# Patient Record
Sex: MALE | Race: WHITE | NOT HISPANIC OR LATINO | Employment: FULL TIME | URBAN - NONMETROPOLITAN AREA
[De-identification: names, ages, dates, MRNs, and addresses within clinical notes are randomized per-mention and may not be internally consistent; named-entity substitution may affect disease eponyms.]

---

## 2021-02-05 ENCOUNTER — OFFICE VISIT (OUTPATIENT)
Dept: ORTHOPEDIC SURGERY | Facility: CLINIC | Age: 51
End: 2021-02-05

## 2021-02-05 ENCOUNTER — HOSPITAL ENCOUNTER (OUTPATIENT)
Dept: GENERAL RADIOLOGY | Facility: HOSPITAL | Age: 51
Discharge: HOME | End: 2021-02-05
Admitting: FAMILY MEDICINE

## 2021-02-05 VITALS
HEIGHT: 68 IN | WEIGHT: 167 LBS | HEART RATE: 60 BPM | SYSTOLIC BLOOD PRESSURE: 140 MMHG | TEMPERATURE: 97.8 F | BODY MASS INDEX: 25.31 KG/M2 | DIASTOLIC BLOOD PRESSURE: 82 MMHG

## 2021-02-05 DIAGNOSIS — M25.512 CHRONIC LEFT SHOULDER PAIN: Primary | ICD-10-CM

## 2021-02-05 DIAGNOSIS — M25.512 CHRONIC LEFT SHOULDER PAIN: ICD-10-CM

## 2021-02-05 DIAGNOSIS — G89.29 CHRONIC LEFT SHOULDER PAIN: ICD-10-CM

## 2021-02-05 DIAGNOSIS — G89.29 CHRONIC LEFT SHOULDER PAIN: Primary | ICD-10-CM

## 2021-02-05 DIAGNOSIS — M75.42 IMPINGEMENT SYNDROME OF LEFT SHOULDER: ICD-10-CM

## 2021-02-05 DIAGNOSIS — M25.612 SHOULDER STIFFNESS, LEFT: ICD-10-CM

## 2021-02-05 DIAGNOSIS — M75.52 BURSITIS OF LEFT SHOULDER: ICD-10-CM

## 2021-02-05 PROCEDURE — 20610 DRAIN/INJ JOINT/BURSA W/O US: CPT | Performed by: FAMILY MEDICINE

## 2021-02-05 PROCEDURE — 73030 X-RAY EXAM OF SHOULDER: CPT | Performed by: RADIOLOGY

## 2021-02-05 PROCEDURE — 99204 OFFICE O/P NEW MOD 45 MIN: CPT | Performed by: FAMILY MEDICINE

## 2021-02-05 PROCEDURE — 73030 X-RAY EXAM OF SHOULDER: CPT

## 2021-02-05 RX ORDER — NAPROXEN 250 MG/1
250 TABLET ORAL 2 TIMES DAILY PRN
COMMUNITY

## 2021-02-05 RX ORDER — IBUPROFEN 800 MG/1
800 TABLET ORAL 3 TIMES DAILY
Qty: 90 TABLET | Refills: 1 | Status: SHIPPED | OUTPATIENT
Start: 2021-02-05

## 2021-02-05 RX ORDER — LIDOCAINE HYDROCHLORIDE 10 MG/ML
8 INJECTION, SOLUTION EPIDURAL; INFILTRATION; INTRACAUDAL; PERINEURAL
Status: COMPLETED | OUTPATIENT
Start: 2021-02-05 | End: 2021-02-05

## 2021-02-05 RX ORDER — TRIAMCINOLONE ACETONIDE 40 MG/ML
40 INJECTION, SUSPENSION INTRA-ARTICULAR; INTRAMUSCULAR
Status: COMPLETED | OUTPATIENT
Start: 2021-02-05 | End: 2021-02-05

## 2021-02-05 RX ORDER — IBUPROFEN 200 MG
200 TABLET ORAL EVERY 6 HOURS PRN
COMMUNITY
End: 2021-02-05

## 2021-02-05 RX ADMIN — LIDOCAINE HYDROCHLORIDE 8 ML: 10 INJECTION, SOLUTION EPIDURAL; INFILTRATION; INTRACAUDAL; PERINEURAL at 11:29

## 2021-02-05 RX ADMIN — TRIAMCINOLONE ACETONIDE 40 MG: 40 INJECTION, SUSPENSION INTRA-ARTICULAR; INTRAMUSCULAR at 11:29

## 2021-02-05 NOTE — PROGRESS NOTES
New Patient Visit      Patient: Ladarius Butt  YOB: 1970  Date of Encounter: 02/05/2021  PCP: Kreis, Samuel Duane, MD  Referring Provider: Self Referring     Subjective   Ladarius Butt is a 50 y.o. male who presents to the office today for evaluation of Shoulder Pain      Chief Complaint:   Chief Complaint   Patient presents with   • Shoulder Pain       HPI:   HPI  New patient presents complaining of ongoing left shoulder pain for about a year. Pain bothers him worse when he has working with his arm above midline. Gets pain on the lateral shoulder sometimes severe. Says this started when he was doing CrossFit with his son and they did a workout with a lot of Burpees. He noticed that he was sore after and it has continued In some capacity ever since. Pain never goes away completely and sometimes wakes him up at night. He is not having any pain. He has been taking ibuprofen and Aleve intermittently which are helpful.  Active Problem List:  There is no problem list on file for this patient.      Past Medical History:  History reviewed. No pertinent past medical history.    Past Surgical History:  History reviewed. No pertinent surgical history.    Family History:  No family history on file.    Social History:  Social History     Socioeconomic History   • Marital status:      Spouse name: Not on file   • Number of children: Not on file   • Years of education: Not on file   • Highest education level: Not on file   Tobacco Use   • Smoking status: Never Smoker   Substance and Sexual Activity   • Alcohol use: Defer   • Drug use: Defer   • Sexual activity: Defer       Medications:  Current Outpatient Medications   Medication Sig Dispense Refill   • ibuprofen (ADVIL,MOTRIN) 200 MG tablet Take 200 mg by mouth Every 6 (Six) Hours As Needed for Mild Pain .     • naproxen (NAPROSYN) 250 MG tablet Take 250 mg by mouth 2 (Two) Times a Day As Needed for Mild Pain .       No current facility-administered  "medications for this visit.        Allergies:  No Known Allergies    Review of Systems:   Review of Systems   Constitutional: Positive for activity change.   Respiratory: Negative.    Cardiovascular: Negative.    Musculoskeletal: Positive for arthralgias and myalgias. Negative for neck pain and neck stiffness.   Neurological: Negative for weakness and numbness.       Physical Exam:   Physical Exam  Vitals signs and nursing note reviewed.   Constitutional:       General: He is not in acute distress.     Appearance: Normal appearance.   Cardiovascular:      Rate and Rhythm: Normal rate.   Pulmonary:      Effort: Pulmonary effort is normal. No respiratory distress.      Breath sounds: Normal breath sounds.   Musculoskeletal:      Left shoulder: He exhibits decreased range of motion, tenderness and spasm. He exhibits no bony tenderness, normal pulse and normal strength.      Comments: Tender palpation over lateral shoulder/deltoid near the AC. Not tender anywhere else. Pain is exacerbated in this area on elevation, internal rotation, Neer's test AC shear and empty can.    Rotator cuff biceps triceps all intact. Mildly restricted range of motion in elevation and notably restricted in internal rotation with pain.    Exam of C-spine elbow and wrist WNL. Negative Spurling.   Neurological:      General: No focal deficit present.      Mental Status: He is alert and oriented to person, place, and time.      Sensory: No sensory deficit.      Motor: No weakness.      Deep Tendon Reflexes: Reflexes normal.       GENERAL: 50 y.o. male, alert and oriented X 3 in no acute distress.   Visit Vitals  /82   Pulse 60   Temp 97.8 °F (36.6 °C)   Ht 172.7 cm (68\")   Wt 75.8 kg (167 lb)   BMI 25.39 kg/m²       Radiology Results:    Xr Shoulder 2+ View Left    Result Date: 2/5/2021    No acute findings in the left shoulder.  This report was finalized on 2/5/2021 11:03 AM by Dr. Yohan Edwards MD.    Independently reviewed imaging and " agree with radiologist    Large Joint Arthrocentesis: L subacromial bursa  Date/Time: 2/5/2021 11:29 AM  Consent given by: patient  Site marked: site marked  Timeout: Immediately prior to procedure a time out was called to verify the correct patient, procedure, equipment, support staff and site/side marked as required   Supporting Documentation  Indications: pain   Procedure Details  Location: shoulder - L subacromial bursa  Needle size: 22 G  Approach: posterior  Medications administered: 40 mg triamcinolone acetonide 40 MG/ML; 8 mL lidocaine PF 1% 1 %  Patient tolerance: patient tolerated the procedure well with no immediate complications      Injection site on the posterior left shoulder was cleaned with alcohol and iodine.  Ethyl chloride was used for anesthesia.  Using sterile technique and a 22-gauge 1.5 inch needle, the patient was injected into the left subacromial space using posterior approach with a mixture of 40 mg triamcinolone and 9 cc 1% lidocaine without epinephrine.  Patient did note improved pain after there were no adverse effects.  Follow-up care and precautions were discussed.    Assessment & Plan:   Assessment/Plan   Diagnoses and all orders for this visit:    1. Chronic left shoulder pain (Primary)  -     XR shoulder 2+ vw left; Future    2. Bursitis of left shoulder    3. Impingement syndrome of left shoulder    4. Shoulder stiffness, left    Other orders  -     Large Joint Arthrocentesis        MEDICATION ISSUES:  Discussed medication options and treatment plan of prescribed medication as well as the risks, benefits, and side effects including potential falls, possible impaired driving and metabolic adversities among others. Patient is agreeable to call the office with any worsening of symptoms or onset of side effects. Patient is agreeable to call 911 or go to the nearest ER should he/she begin having SI/HI.     MEDS ORDERED DURING VISIT:  No orders of the defined types were placed in this  encounter.  Patient did well with injection.  Will call us in 1 week to let us know how he is responded, and will come in sooner if he has any adverse effects.  Discussed follow-up care precautions.  Patient was given handouts for shoulder exercises and will do them daily and follow-up in 1 month.  Sent in a prescription for 800 mg ibuprofen which patient will take consistently for 2 weeks and then as needed.         This document has been electronically signed by Nick Hoover DO   February 5, 2021 11:30 EST    Part of this note may be an electronic transcription/translation of spoken language to printed text using the Dragon Dictation System.

## 2021-03-18 ENCOUNTER — BULK ORDERING (OUTPATIENT)
Dept: CASE MANAGEMENT | Facility: OTHER | Age: 51
End: 2021-03-18

## 2021-03-18 DIAGNOSIS — Z23 IMMUNIZATION DUE: ICD-10-CM

## 2024-11-29 ENCOUNTER — APPOINTMENT (OUTPATIENT)
Dept: GENERAL RADIOLOGY | Facility: HOSPITAL | Age: 54
End: 2024-11-29
Payer: COMMERCIAL

## 2024-11-29 ENCOUNTER — HOSPITAL ENCOUNTER (EMERGENCY)
Facility: HOSPITAL | Age: 54
Discharge: HOME | End: 2024-11-29
Attending: EMERGENCY MEDICINE
Payer: COMMERCIAL

## 2024-11-29 VITALS
SYSTOLIC BLOOD PRESSURE: 116 MMHG | DIASTOLIC BLOOD PRESSURE: 76 MMHG | BODY MASS INDEX: 26.52 KG/M2 | RESPIRATION RATE: 18 BRPM | WEIGHT: 175 LBS | HEART RATE: 58 BPM | OXYGEN SATURATION: 96 % | TEMPERATURE: 97.5 F | HEIGHT: 68 IN

## 2024-11-29 DIAGNOSIS — T50.905A ADVERSE EFFECT OF DRUG, INITIAL ENCOUNTER: Primary | ICD-10-CM

## 2024-11-29 DIAGNOSIS — R00.1 BRADYCARDIA: ICD-10-CM

## 2024-11-29 DIAGNOSIS — I95.2 HYPOTENSION DUE TO DRUGS: ICD-10-CM

## 2024-11-29 LAB
ALBUMIN SERPL-MCNC: 3.7 G/DL (ref 3.5–5.2)
ALBUMIN/GLOB SERPL: 1.8 G/DL
ALP SERPL-CCNC: 60 U/L (ref 39–117)
ALT SERPL W P-5'-P-CCNC: <5 U/L (ref 1–41)
ANION GAP SERPL CALCULATED.3IONS-SCNC: 7 MMOL/L (ref 5–15)
ANISOCYTOSIS BLD QL: ABNORMAL
AST SERPL-CCNC: 14 U/L (ref 1–40)
BASOPHILS # BLD MANUAL: 0.05 10*3/MM3 (ref 0–0.2)
BASOPHILS NFR BLD MANUAL: 1 % (ref 0–1.5)
BILIRUB SERPL-MCNC: 0.8 MG/DL (ref 0–1.2)
BUN BLDA-MCNC: 12 MG/DL (ref 8–26)
BUN SERPL-MCNC: 9 MG/DL (ref 6–20)
BUN/CREAT SERPL: 8.8 (ref 7–25)
CA-I BLDA-SCNC: 1.19 MMOL/L (ref 1.2–1.32)
CALCIUM SPEC-SCNC: 9.1 MG/DL (ref 8.6–10.5)
CHLORIDE BLDA-SCNC: 105 MMOL/L (ref 98–109)
CHLORIDE SERPL-SCNC: 106 MMOL/L (ref 98–107)
CO2 BLDA-SCNC: 26 MMOL/L (ref 24–29)
CO2 SERPL-SCNC: 27 MMOL/L (ref 22–29)
CREAT BLDA-MCNC: 1 MG/DL (ref 0.6–1.3)
CREAT SERPL-MCNC: 1.02 MG/DL (ref 0.76–1.27)
DEPRECATED RDW RBC AUTO: 56.5 FL (ref 37–54)
EGFRCR SERPLBLD CKD-EPI 2021: 86.3 ML/MIN/1.73
EGFRCR SERPLBLD CKD-EPI 2021: 88.3 ML/MIN/1.73
EOSINOPHIL # BLD MANUAL: 0.05 10*3/MM3 (ref 0–0.4)
EOSINOPHIL NFR BLD MANUAL: 1 % (ref 0.3–6.2)
ERYTHROCYTE [DISTWIDTH] IN BLOOD BY AUTOMATED COUNT: 23.2 % (ref 12.3–15.4)
GLOBULIN UR ELPH-MCNC: 2.1 GM/DL
GLUCOSE BLDC GLUCOMTR-MCNC: 82 MG/DL (ref 70–130)
GLUCOSE SERPL-MCNC: 90 MG/DL (ref 65–99)
HCT VFR BLD AUTO: 37.8 % (ref 37.5–51)
HCT VFR BLDA CALC: 43 % (ref 38–51)
HGB BLD-MCNC: 14 G/DL (ref 13–17.7)
HGB BLDA-MCNC: 14.6 G/DL (ref 12–17)
HOLD SPECIMEN: NORMAL
LYMPHOCYTES # BLD MANUAL: 1.06 10*3/MM3 (ref 0.7–3.1)
LYMPHOCYTES NFR BLD MANUAL: 4 % (ref 5–12)
MAGNESIUM SERPL-MCNC: 2 MG/DL (ref 1.6–2.6)
MCH RBC QN AUTO: 31.6 PG (ref 26.6–33)
MCHC RBC AUTO-ENTMCNC: 37 G/DL (ref 31.5–35.7)
MCV RBC AUTO: 85.3 FL (ref 79–97)
MONOCYTES # BLD: 0.2 10*3/MM3 (ref 0.1–0.9)
NEUTROPHILS # BLD AUTO: 3.68 10*3/MM3 (ref 1.7–7)
NEUTROPHILS NFR BLD MANUAL: 73 % (ref 42.7–76)
NT-PROBNP SERPL-MCNC: <36 PG/ML (ref 0–900)
PLATELET # BLD AUTO: 311 10*3/MM3 (ref 140–450)
POTASSIUM BLDA-SCNC: 4.2 MMOL/L (ref 3.5–4.9)
POTASSIUM SERPL-SCNC: 4.7 MMOL/L (ref 3.5–5.2)
PROT SERPL-MCNC: 5.8 G/DL (ref 6–8.5)
QT INTERVAL: 492 MS
QT INTERVAL: 496 MS
QTC INTERVAL: 420 MS
QTC INTERVAL: 443 MS
RBC # BLD AUTO: 4.43 10*6/MM3 (ref 4.14–5.8)
SMALL PLATELETS BLD QL SMEAR: ADEQUATE
SODIUM BLD-SCNC: 140 MMOL/L (ref 138–146)
SODIUM SERPL-SCNC: 140 MMOL/L (ref 136–145)
TROPONIN T SERPL HS-MCNC: <6 NG/L
TROPONIN T SERPL HS-MCNC: <6 NG/L
TSH SERPL DL<=0.05 MIU/L-ACNC: 1.74 UIU/ML (ref 0.27–4.2)
VARIANT LYMPHS NFR BLD MANUAL: 21 % (ref 19.6–45.3)
WBC MORPH BLD: NORMAL
WBC NRBC COR # BLD AUTO: 5.04 10*3/MM3 (ref 3.4–10.8)
WHOLE BLOOD HOLD COAG: NORMAL
WHOLE BLOOD HOLD SPECIMEN: NORMAL

## 2024-11-29 PROCEDURE — 83880 ASSAY OF NATRIURETIC PEPTIDE: CPT | Performed by: EMERGENCY MEDICINE

## 2024-11-29 PROCEDURE — 80050 GENERAL HEALTH PANEL: CPT | Performed by: EMERGENCY MEDICINE

## 2024-11-29 PROCEDURE — 83735 ASSAY OF MAGNESIUM: CPT | Performed by: EMERGENCY MEDICINE

## 2024-11-29 PROCEDURE — 36415 COLL VENOUS BLD VENIPUNCTURE: CPT

## 2024-11-29 PROCEDURE — 80047 BASIC METABLC PNL IONIZED CA: CPT | Performed by: EMERGENCY MEDICINE

## 2024-11-29 PROCEDURE — 99284 EMERGENCY DEPT VISIT MOD MDM: CPT

## 2024-11-29 PROCEDURE — 85007 BL SMEAR W/DIFF WBC COUNT: CPT | Performed by: EMERGENCY MEDICINE

## 2024-11-29 PROCEDURE — 85014 HEMATOCRIT: CPT | Performed by: EMERGENCY MEDICINE

## 2024-11-29 PROCEDURE — 71045 X-RAY EXAM CHEST 1 VIEW: CPT

## 2024-11-29 PROCEDURE — 93005 ELECTROCARDIOGRAM TRACING: CPT | Performed by: EMERGENCY MEDICINE

## 2024-11-29 PROCEDURE — 99222 1ST HOSP IP/OBS MODERATE 55: CPT | Performed by: INTERNAL MEDICINE

## 2024-11-29 PROCEDURE — 84484 ASSAY OF TROPONIN QUANT: CPT | Performed by: EMERGENCY MEDICINE

## 2024-11-29 RX ORDER — SODIUM CHLORIDE 0.9 % (FLUSH) 0.9 %
10 SYRINGE (ML) INJECTION AS NEEDED
Status: DISCONTINUED | OUTPATIENT
Start: 2024-11-29 | End: 2024-11-29 | Stop reason: HOSPADM

## 2024-11-29 NOTE — DISCHARGE INSTRUCTIONS
I have referred you to the heart and valve clinic.  If they do not call you by noon on Monday I recommend calling them to set up close outpatient follow-up with likely external cardiac monitor placement.     will be following you on an outpatient basis as well.  He is the cardiologist who saw you here in the emergency department.    If you have any concern of worsening condition please return to the emergency department immediately.

## 2024-11-29 NOTE — CONSULTS
[facility] Cardiology  Consultation History and Physical  Ladarius Butt  1970      PCP:   Kreis, Samuel Duane, MD        Date of  Consultation: 11/29/2024 13:23 EST     Reason for Consultation: Bradycardia    History of Present Illness:  Ladarius Butt  Is a 54 y.o. male with no significant chronic medical problems.  He was at outpatient orthopedic surgery center preparing for left rotator cuff surgery.  He reportedly received an interscalene nerve block with ropivacaine and shortly thereafter about 25 minutes became increasingly bradycardic, ultimately having sinus arrest and a junctional rhythm with a heart rate in the 20s.  He received a lipid infusion for reversal of the anesthetic as well as a dose of atropine and reportedly heart rates return to the 50s at that time.  He was then transported via EMS to [facility] ER heart rate has remained stable since then.  Patient currently is in no acute distress normotensive heart rate in the 60s.  EKG shows sinus rhythm with no AV block.  He has no cardiac history no chest pain history or recent history of syncope.  He does report previous syncopal episode when getting a blood draw in the past.  He is not on any chronic medications.  Troponin was normal as were other basic labs.  Cardiology consulted for further recommendation.  When the patient was bradycardic he had also received Versed and ketamine so he was not alert or responsive at that time.  He is now back to baseline      There are no active problems to display for this patient.      No Known Allergies    Social History     Socioeconomic History    Marital status:    Tobacco Use    Smoking status: Never   Substance and Sexual Activity    Alcohol use: Defer    Drug use: Defer    Sexual activity: Defer       History reviewed. No pertinent family history.    Current Medications:    Current Facility-Administered Medications:     sodium chloride 0.9 % flush 10 mL, 10 mL,  "Intravenous, PRN, Michael Guillen MD    Current Outpatient Medications:     HYDROcodone-acetaminophen (NORCO) 5-325 MG per tablet, Take 1 tablet by mouth Every 6 (Six) Hours As Needed., Disp: 10 tablet, Rfl: 0    ibuprofen (ADVIL,MOTRIN) 800 MG tablet, Take 1 tablet by mouth 3 (Three) Times a Day., Disp: 90 tablet, Rfl: 1    naproxen (NAPROSYN) 250 MG tablet, Take 250 mg by mouth 2 (Two) Times a Day As Needed for Mild Pain ., Disp: , Rfl:     ondansetron (ZOFRAN) 8 MG tablet, Take 1 tablet by mouth Every 8 (Eight) Hours As Needed., Disp: 10 tablet, Rfl: 0     ROS    OBJECTIVE:  Vitals:    11/29/24 1009 11/29/24 1029 11/29/24 1100 11/29/24 1200   BP: 117/78 117/72     BP Location: Right arm Right arm     Patient Position: Sitting Lying     Pulse: 51 50 53 55   Resp: 14 18 18 18   Temp: 97.5 °F (36.4 °C)      TempSrc: Oral      SpO2: 96% 96% 96% 98%   Weight: 79.4 kg (175 lb)      Height: 172.7 cm (68\")        No intake/output data recorded.  I/O this shift:  In: 150 [I.V.:150]  Out: -   Intake & Output (last 3 days)         11/26 0701  11/27 0700 11/27 0701  11/28 0700 11/28 0701  11/29 0700 11/29 0701 11/30 0700    I.V. (mL/kg)    150 (1.9)    Total Intake(mL/kg)    150 (1.9)    Net    +150                       Physical Exam  Constitutional:       Appearance: Normal appearance.   HENT:      Head: Normocephalic and atraumatic.   Cardiovascular:      Rate and Rhythm: Normal rate and regular rhythm.      Pulses: Normal pulses.      Heart sounds: Normal heart sounds.   Pulmonary:      Effort: Pulmonary effort is normal.      Breath sounds: Normal breath sounds.   Musculoskeletal:      Right lower leg: No edema.      Left lower leg: No edema.         Diagnostic Data:  Lab Results (last 24 hours)       Procedure Component Value Units Date/Time    Single High Sensitivity Troponin T [[id number]]  (Normal) Collected: 11/29/24 1154    Specimen: Blood Updated: 11/29/24 1240     HS Troponin T <6 ng/L      Comment: Specimen " hemolyzed.  Results may be falsely decreased.       Narrative:      High Sensitive Troponin T Reference Range:  <14.0 ng/L- Negative Female for AMI  <22.0 ng/L- Negative Male for AMI  >=14 - Abnormal Female indicating possible myocardial injury.  >=22 - Abnormal Male indicating possible myocardial injury.   Clinicians would have to utilize clinical acumen, EKG, Troponin, and serial changes to determine if it is an Acute Myocardial Infarction or myocardial injury due to an underlying chronic condition.         CBC & Differential [[id number]]  (Abnormal) Collected: 11/29/24 1123    Specimen: Blood Updated: 11/29/24 1234    Narrative:      The following orders were created for panel order CBC & Differential.  Procedure                               Abnormality         Status                     ---------                               -----------         ------                     CBC Auto Differential[[id number]]        Abnormal            Final result               Scan Slide[[id number]]                                                                    Please view results for these tests on the individual orders.    Manual Differential [[id number]]  (Abnormal) Collected: 11/29/24 1123    Specimen: Blood Updated: 11/29/24 1234     Neutrophil % 73.0 %      Lymphocyte % 21.0 %      Monocyte % 4.0 %      Eosinophil % 1.0 %      Basophil % 1.0 %      Neutrophils Absolute 3.68 10*3/mm3      Lymphocytes Absolute 1.06 10*3/mm3      Monocytes Absolute 0.20 10*3/mm3      Eosinophils Absolute 0.05 10*3/mm3      Basophils Absolute 0.05 10*3/mm3      Anisocytosis Slight/1+     WBC Morphology Normal     Platelet Estimate Adequate    CBC Auto Differential [[id number]]  (Abnormal) Collected: 11/29/24 1123    Specimen: Blood Updated: 11/29/24 1234     WBC 5.04 10*3/mm3      RBC 4.43 10*6/mm3      Hemoglobin 14.0 g/dL      Hematocrit 37.8 %      MCV 85.3 fL      MCH 31.6 pg      MCHC 37.0 g/dL      RDW 23.2 %      RDW-SD 56.5 fl       Platelets 311 10*3/mm3     Comprehensive Metabolic Panel [[id number]]  (Abnormal) Collected: 11/29/24 1123    Specimen: Blood Updated: 11/29/24 1218     Glucose 90 mg/dL      BUN 9 mg/dL      Creatinine 1.02 mg/dL      Sodium 140 mmol/L      Potassium 4.7 mmol/L      Comment: Specimen hemolyzed.  Result may be falsely elevated.  Specimen very lipemic. Review results accordingly.        Chloride 106 mmol/L      CO2 27.0 mmol/L      Calcium 9.1 mg/dL      Total Protein 5.8 g/dL      Albumin 3.7 g/dL      ALT (SGPT) <5 U/L      Comment: Specimen hemolyzed.  Result may  be falsely elevated.  Specimen very lipemic. Review results accordingly.        AST (SGOT) 14 U/L      Alkaline Phosphatase 60 U/L      Total Bilirubin 0.8 mg/dL      Globulin 2.1 gm/dL      Comment: Calculated Result        A/G Ratio 1.8 g/dL      BUN/Creatinine Ratio 8.8     Anion Gap 7.0 mmol/L      eGFR 86.3 mL/min/1.73     Narrative:      GFR Normal >60  Chronic Kidney Disease <60  Kidney Failure <15    Specimen is very lipemic. Review results accordingly.      Magnesium [[id number]]  (Normal) Collected: 11/29/24 1123    Specimen: Blood Updated: 11/29/24 1218     Magnesium 2.0 mg/dL     BNP [[id number]]  (Normal) Collected: 11/29/24 1123    Specimen: Blood Updated: 11/29/24 1218     proBNP <36.0 pg/mL     Narrative:      This assay is used as an aid in the diagnosis of individuals suspected of having heart failure. It can be used as an aid in the diagnosis of acute decompensated heart failure (ADHF) in patients presenting with signs and symptoms of ADHF to the emergency department (ED). In addition, NT-proBNP of <300 pg/mL indicates ADHF is not likely.    Age Range Result Interpretation  NT-proBNP Concentration (pg/mL:      <50             Positive            >450                   Gray                 300-450                    Negative             <300    50-75           Positive            >900                  Gray                300-900                   Negative            <300      >75             Positive            >1800                  Gray                300-1800                  Negative            <300    TSH [[id number]]  (Normal) Collected: 11/29/24 1123    Specimen: Blood Updated: 11/29/24 1209     TSH 1.740 uIU/mL     POCT CHEM 8 [[id number]]  (Abnormal) Collected: 11/29/24 1126    Specimen: Blood Updated: 11/29/24 1147     Glucose 82 mg/dL      BUN 12 mg/dL      Creatinine 1.00 mg/dL      Sodium 140 mmol/L      POC Potassium 4.2 mmol/L      Chloride 105 mmol/L      Total CO2 26 mmol/L      Hemoglobin 14.6 g/dL      Comment: Serial Number: [id number]:  [id number]        Hematocrit 43 %      Ionized Calcium 1.19 mmol/L      eGFR 88.3 mL/min/1.73     Barre Draw [[id number]] Collected: 11/29/24 1123    Specimen: Blood Updated: 11/29/24 1145    Narrative:      The following orders were created for panel order Barre Draw.  Procedure                               Abnormality         Status                     ---------                               -----------         ------                     Green Top (Gel)[[id number]]                                  Final result               Lavender Top[[id number]]                                     Final result               Gold Top - SST[[id number]]                                   Final result               Gray Top[[id number]]                                         Final result               Light Blue Top[[id number]]                                   Final result                 Please view results for these tests on the individual orders.    Green Top (Gel) [[id number]] Collected: 11/29/24 1123    Specimen: Blood Updated: 11/29/24 1145     Extra Tube Hold for add-ons.     Comment: Auto resulted.       Lavender Top [[id number]] Collected: 11/29/24 1123    Specimen: Blood Updated: 11/29/24 1130     Extra Tube hold for add-on     Comment: Auto resulted       Gold Top - SST [[id number]] Collected: 11/29/24 1123     Specimen: Blood Updated: 11/29/24 1130     Extra Tube Hold for add-ons.     Comment: Auto resulted.       Mejia Top [[id number]] Collected: 11/29/24 1123    Specimen: Blood Updated: 11/29/24 1130     Extra Tube Hold for add-ons.     Comment: Auto resulted.       Light Blue Top [[id number]] Collected: 11/29/24 1123    Specimen: Blood Updated: 11/29/24 1130     Extra Tube Hold for add-ons.     Comment: Auto resulted             ECG/EMG Results (last 24 hours)       Procedure Component Value Units Date/Time    ECG 12 Lead Other; Dysrhythmia [[id number]] Collected: 11/29/24 1018     Updated: 11/29/24 1019     QT Interval 496 ms      QTC Interval 443 ms     Narrative:      Test Reason : Other~  Blood Pressure :   */*   mmHG  Vent. Rate :  48 BPM     Atrial Rate :  48 BPM     P-R Int : 156 ms          QRS Dur :  90 ms      QT Int : 496 ms       P-R-T Axes :   4   4   5 degrees    QTcB Int : 443 ms    Sinus bradycardia with premature supraventricular complexes  Otherwise normal ECG  No previous ECGs available    Referred By: MS           Confirmed By:               * No active hospital problems. *      Assessment/Plan:      Transient bradycardia and sinus arrest with junctional rhythm  Appears to be related to patient anesthetic administration with ropivacaine which can cause symptomatic bradycardia.  The bradycardia resolved with Intralipid injection which is used to reverse the anesthetic effects  Patient now sinus rhythm to sinus bradycardia in the 50s.  He is a runner but the chronic longstanding sinus bradycardia  No other cardiac symptoms.  No further arrhythmias noted.  Normal troponin.  At this time I believe patient could be discharged from the ER with outpatient follow-up arranged.  Did discuss with him evaluation with a Holter monitor to ensure no further arrhythmias outside of this perioperative event  We can obtain echocardiogram in the outpatient setting as well    Discussed with Dr. Mindy CARLTON  MD Joel FACC

## 2024-11-29 NOTE — ED PROVIDER NOTES
EMERGENCY DEPARTMENT ENCOUNTER    Pt Name: Ladarius Butt  MRN: [id number]  Pt :   1970  Room Number:    Date of encounter:  2024  PCP: Kreis, Samuel Duane, MD  ED Provider: Michael Guillen MD    Historian: Patient, paramedics, patient's anesthesiologist for today's procedure      HPI:  Chief Complaint: Severe bradycardia        Context: Ladarius Butt is a 54 y.o. male who presents to the ED after an episode of severe bradycardia while being prepared for left rotator cuff surgery.  The patient was cared for at [facility].  He received an interest scalene nerve block on the left side with ropivacaine.  No blood was aspirated during the procedure.  Roughly 25 minutes after the block the patient became increasingly bradycardic.  His normal heart rate runs around 50 as he is a runner but he went into what Dr. Garcia, and anesthesia, reports to me as being sinus bradycardia in the 30s and then a complete heart block with heart rate in the 20s lasting less than 1 to 2 minutes.  The only other medications the patient received today were Versed and ketamine.  The patient received Intralipid and a milligram of atropine.  His heart rate came back up into the 50 region and paramedics transported him to our facility without further incident.      PAST MEDICAL HISTORY  History reviewed. No pertinent past medical history.      PAST SURGICAL HISTORY  History reviewed. No pertinent surgical history.      FAMILY HISTORY  History reviewed. No pertinent family history.      SOCIAL HISTORY  Social History     Socioeconomic History    Marital status:    Tobacco Use    Smoking status: Never   Substance and Sexual Activity    Alcohol use: Defer    Drug use: Defer    Sexual activity: Defer         ALLERGIES  Patient has no known allergies.        REVIEW OF SYSTEMS  Review of Systems       All systems reviewed and negative except for those discussed in HPI.       PHYSICAL  EXAM    I have reviewed the triage vital signs and nursing notes.    ED Triage Vitals [11/29/24 1009]   Temp Heart Rate Resp BP SpO2   97.5 °F (36.4 °C) 51 14 117/78 96 %      Temp src Heart Rate Source Patient Position BP Location FiO2 (%)   Oral Monitor Sitting Right arm --       Physical Exam  GENERAL:   Appears in no acute distress.  Pleasant, nontoxic in a hallway bed 1.  He appears nontoxic.  I speak with the patient and paramedics who brought him soon after arrival in the emergency department.  HENT: Nares patent.  EYES: No scleral icterus.  CV: Regular rhythm, bradycardic rate.  Heart rate roughly 50, continuous, with sinus rhythm on the hallway cardiac monitor.    RESPIRATORY: Normal effort.  No audible wheezes, rales or rhonchi.  ABDOMEN: Soft, nontender  MUSCULOSKELETAL: No deformities.   NEURO: Alert, moves all extremities, follows commands.  Alert and oriented x 3 with clear speech.  SKIN: Warm, dry, no rash visualized.      LAB RESULTS  Recent Results (from the past 24 hours)   ECG 12 Lead Other; Dysrhythmia    Collection Time: 11/29/24 10:18 AM   Result Value Ref Range    QT Interval 496 ms    QTC Interval 443 ms   Comprehensive Metabolic Panel    Collection Time: 11/29/24 11:23 AM    Specimen: Blood   Result Value Ref Range    Glucose 90 65 - 99 mg/dL    BUN 9 6 - 20 mg/dL    Creatinine 1.02 0.76 - 1.27 mg/dL    Sodium 140 136 - 145 mmol/L    Potassium 4.7 3.5 - 5.2 mmol/L    Chloride 106 98 - 107 mmol/L    CO2 27.0 22.0 - 29.0 mmol/L    Calcium 9.1 8.6 - 10.5 mg/dL    Total Protein 5.8 (L) 6.0 - 8.5 g/dL    Albumin 3.7 3.5 - 5.2 g/dL    ALT (SGPT) <5 1 - 41 U/L    AST (SGOT) 14 1 - 40 U/L    Alkaline Phosphatase 60 39 - 117 U/L    Total Bilirubin 0.8 0.0 - 1.2 mg/dL    Globulin 2.1 gm/dL    A/G Ratio 1.8 g/dL    BUN/Creatinine Ratio 8.8 7.0 - 25.0    Anion Gap 7.0 5.0 - 15.0 mmol/L    eGFR 86.3 >60.0 mL/min/1.73   Magnesium    Collection Time: 11/29/24 11:23 AM    Specimen: Blood   Result Value Ref  Range    Magnesium 2.0 1.6 - 2.6 mg/dL   TSH    Collection Time: 11/29/24 11:23 AM    Specimen: Blood   Result Value Ref Range    TSH 1.740 0.270 - 4.200 uIU/mL   BNP    Collection Time: 11/29/24 11:23 AM    Specimen: Blood   Result Value Ref Range    proBNP <36.0 0.0 - 900.0 pg/mL   Green Top (Gel)    Collection Time: 11/29/24 11:23 AM   Result Value Ref Range    Extra Tube Hold for add-ons.    Lavender Top    Collection Time: 11/29/24 11:23 AM   Result Value Ref Range    Extra Tube hold for add-on    Gold Top - SST    Collection Time: 11/29/24 11:23 AM   Result Value Ref Range    Extra Tube Hold for add-ons.    Gray Top    Collection Time: 11/29/24 11:23 AM   Result Value Ref Range    Extra Tube Hold for add-ons.    Light Blue Top    Collection Time: 11/29/24 11:23 AM   Result Value Ref Range    Extra Tube Hold for add-ons.    CBC Auto Differential    Collection Time: 11/29/24 11:23 AM    Specimen: Blood   Result Value Ref Range    WBC 5.04 3.40 - 10.80 10*3/mm3    RBC 4.43 4.14 - 5.80 10*6/mm3    Hemoglobin 14.0 13.0 - 17.7 g/dL    Hematocrit 37.8 37.5 - 51.0 %    MCV 85.3 79.0 - 97.0 fL    MCH 31.6 26.6 - 33.0 pg    MCHC 37.0 (H) 31.5 - 35.7 g/dL    RDW 23.2 (H) 12.3 - 15.4 %    RDW-SD 56.5 (H) 37.0 - 54.0 fl    Platelets 311 140 - 450 10*3/mm3   Manual Differential    Collection Time: 11/29/24 11:23 AM    Specimen: Blood   Result Value Ref Range    Neutrophil % 73.0 42.7 - 76.0 %    Lymphocyte % 21.0 19.6 - 45.3 %    Monocyte % 4.0 (L) 5.0 - 12.0 %    Eosinophil % 1.0 0.3 - 6.2 %    Basophil % 1.0 0.0 - 1.5 %    Neutrophils Absolute 3.68 1.70 - 7.00 10*3/mm3    Lymphocytes Absolute 1.06 0.70 - 3.10 10*3/mm3    Monocytes Absolute 0.20 0.10 - 0.90 10*3/mm3    Eosinophils Absolute 0.05 0.00 - 0.40 10*3/mm3    Basophils Absolute 0.05 0.00 - 0.20 10*3/mm3    Anisocytosis Slight/1+ None Seen    WBC Morphology Normal Normal    Platelet Estimate Adequate Normal   POCT CHEM 8    Collection Time: 11/29/24 11:26 AM     Specimen: Blood   Result Value Ref Range    Glucose 82 70 - 130 mg/dL    BUN 12 8 - 26 mg/dL    Creatinine 1.00 0.60 - 1.30 mg/dL    Sodium 140 138 - 146 mmol/L    POC Potassium 4.2 3.5 - 4.9 mmol/L    Chloride 105 98 - 109 mmol/L    Total CO2 26 24 - 29 mmol/L    Hemoglobin 14.6 12.0 - 17.0 g/dL    Hematocrit 43 38 - 51 %    Ionized Calcium 1.19 (L) 1.20 - 1.32 mmol/L    eGFR 88.3 >60.0 mL/min/1.73   Single High Sensitivity Troponin T    Collection Time: 11/29/24 11:54 AM    Specimen: Blood   Result Value Ref Range    HS Troponin T <6 <22 ng/L   ECG 12 Lead Rhythm Change    Collection Time: 11/29/24  2:10 PM   Result Value Ref Range    QT Interval 492 ms    QTC Interval 420 ms   Single High Sensitivity Troponin T    Collection Time: 11/29/24  4:40 PM    Specimen: Blood   Result Value Ref Range    HS Troponin T <6 <22 ng/L       If labs were ordered, I independently reviewed the results and considered them in treating the patient.        RADIOLOGY  XR Chest 1 View    Result Date: 11/29/2024  XR CHEST 1 VW Date of Exam: 11/29/2024 10:16 AM EST Indication: Dysrhythmia Dysrhythmia Comparison: None available. Findings: The heart is mildly prominent in size. There is some atelectasis versus scarring at the medial left lung base. No pneumothorax or pleural effusion.     Impression: Atelectasis versus scarring at the medial left lung base. Electronically Signed: Lety Watson MD  11/29/2024 10:43 AM EST  Workstation ID: [id number]     I ordered and independently reviewed the above noted radiographic studies.      I viewed images of chest x-ray which showed no consolidative infiltrate per my independent interpretation.    See radiologist's dictation for official interpretation.        PROCEDURES    Procedures    ECG 12 Lead Rhythm Change   Final Result   Test Reason : Rhythm Change   Blood Pressure :   */*   mmHG   Vent. Rate :  44 BPM     Atrial Rate :  44 BPM      P-R Int : 150 ms          QRS Dur :  86 ms       QT Int :  492 ms       P-R-T Axes :  33  -2   5 degrees     QTcB Int : 420 ms      Marked sinus bradycardia   Abnormal ECG   When compared with ECG of 29-Nov-2024 10:18, (Unconfirmed)   premature supraventricular complexes are no longer present   Confirmed by ROBEL LOPEZ MD ([id number]) on 11/29/2024 2:23:42 PM      Referred By: ED MD           Confirmed By: ROBEL LOPEZ MD      ECG 12 Lead Other; Dysrhythmia   Final Result   Test Reason : Other~   Blood Pressure :   */*   mmHG   Vent. Rate :  48 BPM     Atrial Rate :  48 BPM      P-R Int : 156 ms          QRS Dur :  90 ms       QT Int : 496 ms       P-R-T Axes :   4   4   5 degrees     QTcB Int : 443 ms      Sinus bradycardia with premature supraventricular complexes   Otherwise normal ECG   No previous ECGs available   Confirmed by ROBEL LOPEZ MD ([id number]) on 11/29/2024 8:44:22 PM      Referred By: MS           Confirmed By: ROBEL LOPEZ MD          MEDICATIONS GIVEN IN ER    Medications - No data to display        MEDICAL DECISION MAKING, PROGRESS, and CONSULTS    All labs, if obtained, have been independently reviewed by me.  All radiology studies, if obtained, have been reviewed by me and the radiologist dictating the report.  All EKG's, if obtained, have been independently viewed and interpreted by me/my attending physician.      Discussion below represents my analysis of pertinent findings related to patient's condition, differential diagnosis, treatment plan and final disposition.                                          Differential diagnosis:    Probable reaction to interscalene block, ropivacaine.  Consider acute coronary syndrome, etc.      Additional sources:    - Discussed/ obtained information from independent historians: I spoke with paramedics at bedside.  I spoke with Dr. Garcia of anesthesiology via telephone soon after patient's arrival.    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        Orders placed during this visit:  Orders Placed This Encounter   Procedures    XR Chest 1 View    [place] Draw    Single High Sensitivity Troponin T    Comprehensive Metabolic Panel    Magnesium    TSH    BNP    CBC Auto Differential    Manual Differential    Single High Sensitivity Troponin T    Ambulatory Referral to Cardiology    Continuous Pulse Oximetry    Vital Signs    POCT CHEM 8    ECG 12 Lead Other; Dysrhythmia    ECG 12 Lead Rhythm Change    CBC & Differential    Green Top (Gel)    Lavender Top    Gold Top - SST    Gray Top    Light Blue Top         Additional orders considered but not ordered:  Echocardiogram    ED Course:    Consultants: Cardiology    ED Course as of 11/29/24 2046 Fri Nov 29, 2024   1017 Have contacted [facility] [place] and am waiting to speak with the anesthesiologist who cared for this patient. [MS]   1018 Dr. Arguello  Intrascalene block ropiv  Last syndrome  Neg aspiration  25 min after block  Vsd, ketamine  S alfonzo, 30s then 20s less than 1-2 min  Chb    Interlipid  0950 [MS]   1018 I have involved Rolando Coello, emergency department pharmacist to weigh in on the patient's probable reaction to anesthetic and potential for recurrent adverse effects. [MS]   1256 I spoke with , cardiology on-call.  Case discussed in detail.  He will evaluate the patient in the emergency department and determine final disposition.  At this point the patient appears quite stable.  I have rechecked him and he is alert and vital signs are stable.  Likely he will be able to be discharged to home. [MS]   1312  saw the patient in person.  He spoke with me in person again and we have come up with a good plan for outpatient follow-up.  I will send the patient to the heart and valve clinic.    will follow on an outpatient basis as well.  Awaiting a second troponin/EKG prior to disposition. [MS]   1348 I spoke with Dr. Garcia again at the [facility].  He feels the patient is safe for discharge as far as medication side effect point of view.  I am currently awaiting a second troponin and continued cardiac monitoring prior to final disposition. [MS]   1415 Still awaiting second troponin to be drawn. [MS]   1423 Current heart rate is running between 51 and 55 bpm. [MS]   1459 Currently awaiting repeat troponin.  I have updated patient, wife, multiple family members.  I have rechecked the patient and his heart rate continues to be in an acceptable region mostly running in the 50s. [MS]   1715 HS Troponin T: <6  Repeat troponin multiple hours after the incident is negative.  The heart rate is continue to run in a region of 50 bpm. [MS]      ED Course User Index  [MS] Michael Guillen MD              Shared Decision Making:  After my consideration of clinical presentation and any laboratory/radiology studies obtained, I discussed the findings with the patient/patient representative who is in agreement with the treatment plan and the final disposition.   Risks and benefits of discharge and/or observation/admission were discussed.       AS OF 20:46 EST VITALS:    BP - 116/76  HR - 58  TEMP - 97.5 °F (36.4 °C) (Oral)  O2 SATS - 96%                  DIAGNOSIS  Final diagnoses:   Adverse effect of drug, initial encounter   Bradycardia   Hypotension due to drugs         DISPOSITION  DISCHARGE    Patient discharged in stable condition.    Reviewed implications of results, diagnosis, meds, responsibility to follow up, warning signs and symptoms of possible worsening, potential complications and reasons to return to ER.    Patient/Family voiced understanding of above instructions.    Discussed plan for discharge, as there is no emergent indication for admission.  Pt/family is agreeable and  understands need for follow up and possible repeat testing.  Pt/family is aware that discharge does not mean that nothing is wrong but that it indicates no emergency is currently present that requires admission and they must continue care with follow-up as given below or with a physician of their choice.     FOLLOW-UP  [facility] CARDIOLOGY  [address]  [address]  [facility] [id number] [phone]        Kreis, Samuel Duane, MD  [address] [place] [address] [phone]      NEXT AVAILABLE APPOINTMENT - RECHECK OF CONDITION    [facility] EMERGENCY DEPARTMENT  [address] [address] [phone]    IF YOU HAVE ANY CONCERN OF WORSENING CONDITION         Medication List      No changes were made to your prescriptions during this visit.             Please note that portions of this document were completed with voice recognition software.        Michael Guillen MD  11/29/24 2041       Michael Guillen MD  11/29/24 2046